# Patient Record
Sex: MALE | Race: WHITE | NOT HISPANIC OR LATINO | Employment: FULL TIME | URBAN - METROPOLITAN AREA
[De-identification: names, ages, dates, MRNs, and addresses within clinical notes are randomized per-mention and may not be internally consistent; named-entity substitution may affect disease eponyms.]

---

## 2022-04-11 PROCEDURE — 87491 CHLMYD TRACH DNA AMP PROBE: CPT | Performed by: NURSE PRACTITIONER

## 2022-04-11 PROCEDURE — 87591 N.GONORRHOEAE DNA AMP PROB: CPT | Performed by: NURSE PRACTITIONER

## 2022-04-11 PROCEDURE — 87661 TRICHOMONAS VAGINALIS AMPLIF: CPT | Performed by: NURSE PRACTITIONER

## 2022-07-11 PROCEDURE — 87591 N.GONORRHOEAE DNA AMP PROB: CPT | Performed by: PERSONAL EMERGENCY RESPONSE ATTENDANT

## 2022-07-11 PROCEDURE — 87491 CHLMYD TRACH DNA AMP PROBE: CPT | Performed by: PERSONAL EMERGENCY RESPONSE ATTENDANT

## 2022-07-11 PROCEDURE — 87661 TRICHOMONAS VAGINALIS AMPLIF: CPT | Performed by: PERSONAL EMERGENCY RESPONSE ATTENDANT

## 2025-04-04 ENCOUNTER — OFFICE VISIT (OUTPATIENT)
Age: 45
End: 2025-04-04
Payer: COMMERCIAL

## 2025-04-04 VITALS
SYSTOLIC BLOOD PRESSURE: 140 MMHG | WEIGHT: 219.4 LBS | DIASTOLIC BLOOD PRESSURE: 82 MMHG | BODY MASS INDEX: 25.91 KG/M2 | HEIGHT: 77 IN

## 2025-04-04 DIAGNOSIS — M65.20 CALCIFIC TENDINITIS: Primary | ICD-10-CM

## 2025-04-04 NOTE — PROGRESS NOTES
[facility] [place] [facility] Office Visit     Office Visit       Date: 04/04/2025   Patient Name: Fidel Salinas  MRN: [id number]  YOB: 1980    Referring Physician: Gee Wilkins MD     Chief Complaint:   Chief Complaint   Patient presents with    Left Shoulder - Pain       History of Present Illness:   Fidel Salinas is a 44 y.o. male who presents with new problem of: left shoulder pain.  Onset: atraumatic and gradual in nature. The issue has been ongoing for 4 day(s). Pain is a 7/10 on the pain scale. Pain is described as dull, aching, and stabbing. Associated symptoms include pain. The pain is worse with sleeping, lying on affected side, and any movement of the joint; resting improve the pain. Previous treatments have included: bracing and NSAIDS.    Subjective   Review of Systems: Review of Systems   Constitutional:  Negative for chills, fever, unexpected weight gain and unexpected weight loss.   HENT:  Negative for congestion, postnasal drip and rhinorrhea.    Eyes:  Negative for blurred vision.   Respiratory:  Negative for shortness of breath.    Cardiovascular:  Negative for leg swelling.   Gastrointestinal:  Negative for abdominal pain, nausea and vomiting.   Genitourinary:  Negative for difficulty urinating.   Musculoskeletal:  Positive for arthralgias. Negative for gait problem, joint swelling and myalgias.   Skin:  Negative for skin lesions and wound.   Neurological:  Negative for dizziness, weakness, light-headedness and numbness.   Hematological:  Does not bruise/bleed easily.   Psychiatric/Behavioral:  Negative for depressed mood.         I have reviewed the following portions of the patient's history:History of Present Illness and review of systems.    Past Medical History:   Past Medical History:   Diagnosis Date    Acromioclavicular separation 2004    Fracture of wrist 1996    Prostatitis        Past Surgical History:   Past  "Surgical History:   Procedure Laterality Date    DENTAL PROCEDURE         Family History:   Family History   Problem Relation Age of Onset    Atrial fibrillation Mother     Atrial fibrillation Father        Social History:   Social History     Socioeconomic History    Marital status: Legally    Tobacco Use    Smoking status: Never     Passive exposure: Never    Smokeless tobacco: Never   Vaping Use    Vaping status: Never Used   Substance and Sexual Activity    Alcohol use: Never    Drug use: Never    Sexual activity: Yes     Partners: Female     Birth control/protection: Vasectomy       Medications:   Current Outpatient Medications:     methylPREDNISolone (MEDROL) 4 MG dose pack, Take as directed on package instructions., Disp: 1 each, Rfl: 0    Allergies: No Known Allergies    I reviewed the patient's chief complaint, history of present illness, review of systems, past medical history, surgical history, family history, social history, medications and allergy list.     Objective    Vital Signs:   Vitals:    04/04/25 0929   BP: 140/82   Weight: 99.5 kg (219 lb 6.4 oz)   Height: 195.6 cm (77.01\")     Body mass index is 26.01 kg/m².   BMI is >= 25 and <30. (Overweight) The following options were offered after discussion;: exercise counseling/recommendations and nutrition counseling/recommendations     Patient reports that he is a non-smoker and has not ever been a smoker.  This behavior was applauded and he was encouraged to continue in smoking cessation.  We will continue to monitor at subsequent visits.    Ortho Exam:  Cardiovascular System: Arterial Pulses Right: radial normal. Arterial Pulses Left: radial normal.   C-Spine/Neck: Active Range of Motion: no crepitus or pain elicited on motion and flexion normal, extension normal, rotation normal, and lateral flexion normal.   Shoulders: Inspection Right: no misalignment, erythema, induration, swelling, or warmth and AC prominence normal. Inspection Left: no " misalignment, erythema, induration, swelling, or warmth and AC prominence normal. Bony Palpation Right: no tenderness of the clavicle, the acromioclavicular joint, the greater tuberosity, or the bicipital groove. Bony Palpation Left: tenderness of the clavicle lateral one-third, the acromioclavicular joint, the greater tuberosity, and the bicipital groove and no tenderness of the sternoclavicular joint. Soft Tissue Palpation Left: tenderness of the supraspinatus, the infraspinatus, the glenohumeral joint region, and the lateral cuff insertion. Active Range of Motion Left: limited. Special Tests Right: Hawkin's test positive and empty can sign positive. Special Tests Left: Hawkin's test positive, Neer's test positive, Juncos's test positive, and empty can sign positive.   exam RIGHT shoulder: forward flexion approximately 140 degrees. Abduction 140 degrees. Internal rotation L4. Motor testing supraspinatus 5/5  exam LEFT shoulder: forward flexion approximately 100 degrees. Abduction 80 degrees. Internal rotation SI. Motor testing supraspinatus 4/5    Results Review:   Imaging Results (Last 24 Hours)       ** No results found for the last 24 hours. **        I personally viewed and interpreted radiographs of the left shoulder from 4/3/2025.  No acute fracture or dislocation.  No significant degenerative change.  There is evidence of calcific tendinitis pressing on the oblique view.    Procedures    Assessment / Plan    Assessment/Plan:   Diagnoses and all orders for this visit:    1. Calcific tendinitis (Primary)    Plan:  Pain to the anterior lateral shoulder worsened by motion.  No acute mechanism of injury.  Symptoms ongoing for the last 2 days.  Radiographs show calcific tendinitis of the left rotator cuff.  Recommend finishing Medrol Dosepak.  Can use anti-inflammatories as needed afterwards.  Discontinue sling.  Work on gentle range of motion.  Explained likely need for ultrasound-guided left shoulder  saurabh.  Follow-up in 4 weeks to decide on additional management.    Previous studies reviewed: Radiographs left shoulder.  Urgent care 4/3/2025.  Urgent care 7/11/2022.    Follow Up:   Return in about 4 weeks (around 5/2/2025) for Recheck.      Johnie Gonzalez MD  [facility] [place] [facility]

## 2025-05-02 ENCOUNTER — OFFICE VISIT (OUTPATIENT)
Age: 45
End: 2025-05-02
Payer: COMMERCIAL

## 2025-05-02 VITALS
WEIGHT: 219.36 LBS | SYSTOLIC BLOOD PRESSURE: 112 MMHG | DIASTOLIC BLOOD PRESSURE: 80 MMHG | HEIGHT: 77 IN | BODY MASS INDEX: 25.9 KG/M2

## 2025-05-02 DIAGNOSIS — M65.20 CALCIFIC TENDINITIS: Primary | ICD-10-CM

## 2025-05-02 NOTE — PROGRESS NOTES
[facility] [place] Follow Up Visit     Office Follow Up      Date: 05/02/2025   Patient Name: Fidel Salinas  MRN: [id number]  YOB: 1980    Referring Physician: No ref. provider found     Chief Complaint:   Chief Complaint   Patient presents with    Follow-up     1 month recheck - calcific tendinitis of the left rotator cuff.       History of Present Illness: Fidel Salinas is a 44 y.o. male who returns to clinic today for follow up on left shoulder pain. His pain is a 3 /10 on the pain scale. Patient has tried the following previous treatments bracing , anti-inflammatories, and oral steroids. He mentions current symptoms of pain . He states that these treatments have Improved.      Subjective     Review of Systems: Review of Systems   Constitutional:  Negative for chills, fever, unexpected weight gain and unexpected weight loss.   HENT:  Negative for congestion, postnasal drip and rhinorrhea.    Eyes:  Negative for blurred vision.   Respiratory:  Negative for shortness of breath.    Cardiovascular:  Negative for leg swelling.   Gastrointestinal:  Negative for abdominal pain, nausea and vomiting.   Genitourinary:  Negative for difficulty urinating.   Musculoskeletal:  Positive for arthralgias. Negative for gait problem, joint swelling and myalgias.   Skin:  Negative for skin lesions and wound.   Neurological:  Negative for dizziness, weakness, light-headedness and numbness.   Hematological:  Does not bruise/bleed easily.   Psychiatric/Behavioral:  Negative for depressed mood.    All other systems reviewed and are negative.       Medications: No current outpatient medications on file.    Allergies: No Known Allergies    I have reviewed and updated the patient's chief complaint, history of present illness, review of systems, past medical history, surgical history, family history, social history, medications and allergy list as appropriate.  "    Objective      Vital Signs:   Vitals:    05/02/25 0914   BP: 112/80   Weight: 99.5 kg (219 lb 5.7 oz)   Height: 195.6 cm (77.01\")     Body mass index is 26.01 kg/m².  BMI is >= 25 and <30. (Overweight) The following options were offered after discussion;: exercise counseling/recommendations and nutrition counseling/recommendations     Patient reports that he is a non-smoker and has not ever been a smoker.  This behavior was applauded and he was encouraged to continue in smoking cessation.  We will continue to monitor at subsequent visits.    Ortho Exam:  Exam left shoulder: forward flexion approximately 140 degrees. Abduction 140 degrees. Internal rotation L3. Motor testing supraspinatus 5/5.     Results Review:   Imaging Results (Last 24 Hours)       ** No results found for the last 24 hours. **            Procedures    Assessment / Plan      Assessment/Plan:   Diagnoses and all orders for this visit:    1. Calcific tendinitis (Primary)    Plan:  Great improvement with subacromial corticosteroid injection and home exercise program.  Still has mild limitations at the extremes of motion.  Continue work through home exercise program.  Monitor pain range of motion and strength.  Follow-up as needed.    Follow Up:   Return if symptoms worsen or fail to improve.      Johnie Gonzalez MD  [facility] [place] [facility]  "